# Patient Record
Sex: FEMALE | Race: WHITE | Employment: PART TIME | URBAN - METROPOLITAN AREA
[De-identification: names, ages, dates, MRNs, and addresses within clinical notes are randomized per-mention and may not be internally consistent; named-entity substitution may affect disease eponyms.]

---

## 2017-04-17 ENCOUNTER — APPOINTMENT (OUTPATIENT)
Dept: OTHER | Facility: HOSPITAL | Age: 22
End: 2017-04-17
Attending: PREVENTIVE MEDICINE

## 2017-09-17 ENCOUNTER — CHARTING TRANS (OUTPATIENT)
Dept: OTHER | Age: 22
End: 2017-09-17

## 2017-09-19 ENCOUNTER — CHARTING TRANS (OUTPATIENT)
Dept: OTHER | Age: 22
End: 2017-09-19

## 2018-08-09 PROCEDURE — 88175 CYTOPATH C/V AUTO FLUID REDO: CPT | Performed by: OBSTETRICS & GYNECOLOGY

## 2018-08-13 ENCOUNTER — CHARTING TRANS (OUTPATIENT)
Dept: OTHER | Age: 23
End: 2018-08-13

## 2018-09-05 PROBLEM — R45.87 POOR IMPULSE CONTROL: Status: ACTIVE | Noted: 2018-09-05

## 2018-09-05 PROBLEM — R45.851 SUICIDAL IDEATION: Status: ACTIVE | Noted: 2018-09-05

## 2018-09-05 PROBLEM — R45.4 IRRITABILITY AND ANGER: Status: ACTIVE | Noted: 2018-09-05

## 2018-09-05 PROBLEM — F32.A DEPRESSION: Status: ACTIVE | Noted: 2018-09-05

## 2018-11-27 VITALS
WEIGHT: 140 LBS | HEIGHT: 60 IN | HEART RATE: 84 BPM | RESPIRATION RATE: 20 BRPM | DIASTOLIC BLOOD PRESSURE: 68 MMHG | SYSTOLIC BLOOD PRESSURE: 100 MMHG | BODY MASS INDEX: 27.48 KG/M2 | TEMPERATURE: 98.4 F

## 2018-12-26 ENCOUNTER — WALK IN (OUTPATIENT)
Dept: URGENT CARE | Age: 23
End: 2018-12-26

## 2018-12-26 VITALS
HEIGHT: 60 IN | BODY MASS INDEX: 31.4 KG/M2 | SYSTOLIC BLOOD PRESSURE: 94 MMHG | DIASTOLIC BLOOD PRESSURE: 62 MMHG | RESPIRATION RATE: 16 BRPM | HEART RATE: 80 BPM | TEMPERATURE: 98.5 F | WEIGHT: 159.94 LBS

## 2018-12-26 DIAGNOSIS — J00 ACUTE NASOPHARYNGITIS: Primary | ICD-10-CM

## 2018-12-26 LAB
INTERNAL PROCEDURAL CONTROLS ACCEPTABLE: YES
S PYO AG THROAT QL IA.RAPID: NEGATIVE

## 2018-12-26 PROCEDURE — 99213 OFFICE O/P EST LOW 20 MIN: CPT | Performed by: NURSE PRACTITIONER

## 2018-12-26 PROCEDURE — 87077 CULTURE AEROBIC IDENTIFY: CPT | Performed by: NURSE PRACTITIONER

## 2018-12-26 PROCEDURE — 87880 STREP A ASSAY W/OPTIC: CPT | Performed by: NURSE PRACTITIONER

## 2018-12-26 PROCEDURE — 87081 CULTURE SCREEN ONLY: CPT | Performed by: NURSE PRACTITIONER

## 2018-12-26 RX ORDER — NORETHINDRONE ACETATE AND ETHINYL ESTRADIOL 1; .02 MG/1; MG/1
TABLET ORAL DAILY
COMMUNITY
End: 2022-04-05

## 2018-12-26 RX ORDER — ARIPIPRAZOLE 2 MG/1
2 TABLET ORAL DAILY
COMMUNITY
End: 2022-04-05

## 2018-12-26 SDOH — ECONOMIC STABILITY: FOOD INSECURITY: WITHIN THE PAST 12 MONTHS, YOU WORRIED THAT YOUR FOOD WOULD RUN OUT BEFORE YOU GOT MONEY TO BUY MORE.: PATIENT DECLINED

## 2018-12-26 SDOH — SOCIAL STABILITY: SOCIAL NETWORK: HOW OFTEN DO YOU ATTEND CHURCH OR RELIGIOUS SERVICES?: PATIENT DECLINED

## 2018-12-26 SDOH — ECONOMIC STABILITY: TRANSPORTATION INSECURITY
IN THE PAST 12 MONTHS, HAS THE LACK OF TRANSPORTATION KEPT YOU FROM MEDICAL APPOINTMENTS OR FROM GETTING MEDICATIONS?: PATIENT DECLINED

## 2018-12-26 SDOH — SOCIAL STABILITY: SOCIAL INSECURITY
WITHIN THE LAST YEAR, HAVE YOU BEEN HUMILIATED OR EMOTIONALLY ABUSED IN OTHER WAYS BY YOUR PARTNER OR EX-PARTNER?: PATIENT DECLINED

## 2018-12-26 SDOH — HEALTH STABILITY: MENTAL HEALTH: HOW OFTEN DO YOU HAVE A DRINK CONTAINING ALCOHOL?: NEVER

## 2018-12-26 SDOH — SOCIAL STABILITY: SOCIAL INSECURITY
WITHIN THE LAST YEAR, HAVE YOU BEEN KICKED, HIT, SLAPPED, OR OTHERWISE PHYSICALLY HURT BY YOUR PARTNER OR EX-PARTNER?: PATIENT DECLINED

## 2018-12-26 SDOH — SOCIAL STABILITY: SOCIAL INSECURITY: WITHIN THE LAST YEAR, HAVE YOU BEEN AFRAID OF YOUR PARTNER OR EX-PARTNER?: PATIENT DECLINED

## 2018-12-26 SDOH — SOCIAL STABILITY: SOCIAL NETWORK: HOW OFTEN DO YOU ATTENT MEETINGS OF THE CLUB OR ORGANIZATION YOU BELONG TO?: PATIENT DECLINED

## 2018-12-26 SDOH — ECONOMIC STABILITY: TRANSPORTATION INSECURITY
IN THE PAST 12 MONTHS, HAS LACK OF TRANSPORTATION KEPT YOU FROM MEETINGS, WORK, OR FROM GETTING THINGS NEEDED FOR DAILY LIVING?: PATIENT DECLINED

## 2018-12-26 SDOH — HEALTH STABILITY: PHYSICAL HEALTH: ON AVERAGE, HOW MANY DAYS PER WEEK DO YOU ENGAGE IN MODERATE TO STRENUOUS EXERCISE (LIKE A BRISK WALK)?: 0 DAYS

## 2018-12-26 SDOH — SOCIAL STABILITY: SOCIAL NETWORK
DO YOU BELONG TO ANY CLUBS OR ORGANIZATIONS SUCH AS CHURCH GROUPS UNIONS, FRATERNAL OR ATHLETIC GROUPS, OR SCHOOL GROUPS?: PATIENT DECLINED

## 2018-12-26 SDOH — ECONOMIC STABILITY: INCOME INSECURITY: HOW HARD IS IT FOR YOU TO PAY FOR THE VERY BASICS LIKE FOOD, HOUSING, MEDICAL CARE, AND HEATING?: PATIENT DECLINED

## 2018-12-26 SDOH — SOCIAL STABILITY: SOCIAL INSECURITY
WITHIN THE LAST YEAR, HAVE TO BEEN RAPED OR FORCED TO HAVE ANY KIND OF SEXUAL ACTIVITY BY YOUR PARTNER OR EX-PARTNER?: PATIENT DECLINED

## 2018-12-26 SDOH — ECONOMIC STABILITY: FOOD INSECURITY: WITHIN THE PAST 12 MONTHS, THE FOOD YOU BOUGHT JUST DIDN'T LAST AND YOU DIDN'T HAVE MONEY TO GET MORE.: PATIENT DECLINED

## 2018-12-26 SDOH — HEALTH STABILITY: MENTAL HEALTH
STRESS IS WHEN SOMEONE FEELS TENSE, NERVOUS, ANXIOUS, OR CAN'T SLEEP AT NIGHT BECAUSE THEIR MIND IS TROUBLED. HOW STRESSED ARE YOU?: PATIENT DECLINED

## 2018-12-26 SDOH — SOCIAL STABILITY: SOCIAL NETWORK: HOW OFTEN DO YOU GET TOGETHER WITH FRIENDS OR RELATIVES?: PATIENT DECLINED

## 2018-12-26 SDOH — SOCIAL STABILITY: SOCIAL NETWORK: IN A TYPICAL WEEK, HOW MANY TIMES DO YOU TALK ON THE PHONE WITH FAMILY, FRIENDS, OR NEIGHBORS?: PATIENT DECLINED

## 2018-12-26 SDOH — SOCIAL STABILITY: SOCIAL NETWORK: ARE YOU MARRIED, WIDOWED, DIVORCED, SEPARATED, NEVER MARRIED, OR LIVING WITH A PARTNER?: PATIENT DECLINED

## 2018-12-26 SDOH — HEALTH STABILITY: PHYSICAL HEALTH: ON AVERAGE, HOW MANY MINUTES DO YOU ENGAGE IN EXERCISE AT THIS LEVEL?: 0 MIN

## 2018-12-26 ASSESSMENT — ENCOUNTER SYMPTOMS
EYES NEGATIVE: 1
RESPIRATORY NEGATIVE: 1
ALLERGIC/IMMUNOLOGIC COMMENTS: SEASONAL ALLERGIES
DIZZINESS: 0
HEADACHES: 1
GASTROINTESTINAL NEGATIVE: 1

## 2018-12-28 LAB
REPORT STATUS (RPT): ABNORMAL
S PYO SPEC QL CULT: ABNORMAL
SPECIMEN SOURCE: ABNORMAL

## 2018-12-29 ENCOUNTER — TELEPHONE (OUTPATIENT)
Dept: URGENT CARE | Age: 23
End: 2018-12-29

## 2018-12-31 ENCOUNTER — TELEPHONE (OUTPATIENT)
Dept: SCHEDULING | Age: 23
End: 2018-12-31

## 2019-02-18 ENCOUNTER — WALK IN (OUTPATIENT)
Dept: URGENT CARE | Age: 24
End: 2019-02-18

## 2019-02-18 VITALS
HEART RATE: 76 BPM | BODY MASS INDEX: 31.41 KG/M2 | DIASTOLIC BLOOD PRESSURE: 60 MMHG | TEMPERATURE: 98 F | WEIGHT: 160 LBS | HEIGHT: 60 IN | SYSTOLIC BLOOD PRESSURE: 94 MMHG

## 2019-02-18 DIAGNOSIS — J00 ACUTE NASOPHARYNGITIS: Primary | ICD-10-CM

## 2019-02-18 LAB
INTERNAL PROCEDURAL CONTROLS ACCEPTABLE: YES
S PYO AG THROAT QL IA.RAPID: NEGATIVE

## 2019-02-18 PROCEDURE — 99213 OFFICE O/P EST LOW 20 MIN: CPT | Performed by: NURSE PRACTITIONER

## 2019-02-18 PROCEDURE — 87880 STREP A ASSAY W/OPTIC: CPT | Performed by: NURSE PRACTITIONER

## 2019-02-18 SDOH — SOCIAL STABILITY: SOCIAL NETWORK: IN A TYPICAL WEEK, HOW MANY TIMES DO YOU TALK ON THE PHONE WITH FAMILY, FRIENDS, OR NEIGHBORS?: PATIENT DECLINED

## 2019-02-18 SDOH — HEALTH STABILITY: PHYSICAL HEALTH: ON AVERAGE, HOW MANY MINUTES DO YOU ENGAGE IN EXERCISE AT THIS LEVEL?: 0 MIN

## 2019-02-18 SDOH — HEALTH STABILITY: MENTAL HEALTH: HOW OFTEN DO YOU HAVE A DRINK CONTAINING ALCOHOL?: NEVER

## 2019-02-18 SDOH — ECONOMIC STABILITY: INCOME INSECURITY: HOW HARD IS IT FOR YOU TO PAY FOR THE VERY BASICS LIKE FOOD, HOUSING, MEDICAL CARE, AND HEATING?: PATIENT DECLINED

## 2019-02-18 SDOH — SOCIAL STABILITY: SOCIAL NETWORK: HOW OFTEN DO YOU ATTEND CHURCH OR RELIGIOUS SERVICES?: PATIENT DECLINED

## 2019-02-18 SDOH — SOCIAL STABILITY: SOCIAL NETWORK: HOW OFTEN DO YOU ATTENT MEETINGS OF THE CLUB OR ORGANIZATION YOU BELONG TO?: PATIENT DECLINED

## 2019-02-18 SDOH — SOCIAL STABILITY: SOCIAL NETWORK: HOW OFTEN DO YOU GET TOGETHER WITH FRIENDS OR RELATIVES?: PATIENT DECLINED

## 2019-02-18 SDOH — SOCIAL STABILITY: SOCIAL INSECURITY: WITHIN THE LAST YEAR, HAVE YOU BEEN AFRAID OF YOUR PARTNER OR EX-PARTNER?: PATIENT DECLINED

## 2019-02-18 SDOH — ECONOMIC STABILITY: FOOD INSECURITY: WITHIN THE PAST 12 MONTHS, YOU WORRIED THAT YOUR FOOD WOULD RUN OUT BEFORE YOU GOT MONEY TO BUY MORE.: PATIENT DECLINED

## 2019-02-18 SDOH — SOCIAL STABILITY: SOCIAL NETWORK: ARE YOU MARRIED, WIDOWED, DIVORCED, SEPARATED, NEVER MARRIED, OR LIVING WITH A PARTNER?: PATIENT DECLINED

## 2019-02-18 SDOH — ECONOMIC STABILITY: FOOD INSECURITY: WITHIN THE PAST 12 MONTHS, THE FOOD YOU BOUGHT JUST DIDN'T LAST AND YOU DIDN'T HAVE MONEY TO GET MORE.: PATIENT DECLINED

## 2019-02-18 SDOH — HEALTH STABILITY: PHYSICAL HEALTH: ON AVERAGE, HOW MANY DAYS PER WEEK DO YOU ENGAGE IN MODERATE TO STRENUOUS EXERCISE (LIKE A BRISK WALK)?: 0 DAYS

## 2019-04-15 ENCOUNTER — WALK IN (OUTPATIENT)
Dept: URGENT CARE | Age: 24
End: 2019-04-15

## 2019-04-15 VITALS — BODY MASS INDEX: 31.41 KG/M2 | RESPIRATION RATE: 18 BRPM | WEIGHT: 160 LBS | HEIGHT: 60 IN

## 2019-04-15 DIAGNOSIS — Z11.1 SCREENING-PULMONARY TB: ICD-10-CM

## 2019-04-15 DIAGNOSIS — Z02.1 PRE-EMPLOYMENT EXAMINATION: Primary | ICD-10-CM

## 2019-04-15 PROCEDURE — 86580 TB INTRADERMAL TEST: CPT | Performed by: NURSE PRACTITIONER

## 2019-04-15 PROCEDURE — X0945 SELF PAY APN OR PA PERFORMED ADMINISTRATIVE PHYSICAL: HCPCS | Performed by: NURSE PRACTITIONER

## 2019-04-15 RX ORDER — OLANZAPINE 5 MG/1
5 TABLET ORAL
COMMUNITY
Start: 2019-04-05 | End: 2022-04-05

## 2019-04-17 ENCOUNTER — WALK IN (OUTPATIENT)
Dept: URGENT CARE | Age: 24
End: 2019-04-17

## 2019-04-17 DIAGNOSIS — Z11.1 ENCOUNTER FOR PPD SKIN TEST READING: Primary | ICD-10-CM

## 2019-04-17 LAB
INDURATION: NORMAL MM (ref 0–?)
SKIN TEST RESULT: NEGATIVE

## 2019-04-17 PROCEDURE — X1094 NO CHARGE VISIT: HCPCS | Performed by: NURSE PRACTITIONER

## 2019-05-30 PROBLEM — F32.9 MDD (MAJOR DEPRESSIVE DISORDER), SINGLE EPISODE: Status: ACTIVE | Noted: 2019-05-30

## 2021-06-28 ENCOUNTER — WALK IN (OUTPATIENT)
Dept: URGENT CARE | Age: 26
End: 2021-06-28

## 2021-06-28 DIAGNOSIS — Z11.1 SCREENING-PULMONARY TB: Primary | ICD-10-CM

## 2021-06-28 PROCEDURE — 86580 TB INTRADERMAL TEST: CPT | Performed by: NURSE PRACTITIONER

## 2021-06-30 ENCOUNTER — WALK IN (OUTPATIENT)
Dept: URGENT CARE | Age: 26
End: 2021-06-30

## 2021-06-30 DIAGNOSIS — Z11.1 ENCOUNTER FOR PPD SKIN TEST READING: Primary | ICD-10-CM

## 2021-06-30 LAB
INDURATION: 0 MM (ref 0–?)
SKIN TEST RESULT: NEGATIVE

## 2021-06-30 PROCEDURE — X1094 NO CHARGE VISIT: HCPCS | Performed by: NURSE PRACTITIONER

## 2022-01-10 PROBLEM — F33.1 MDD (MAJOR DEPRESSIVE DISORDER), RECURRENT EPISODE, MODERATE (HCC): Status: ACTIVE | Noted: 2022-01-10

## 2022-01-10 PROBLEM — F44.9 DISSOCIATION: Status: ACTIVE | Noted: 2022-01-10

## 2022-01-10 PROBLEM — F43.10 PTSD (POST-TRAUMATIC STRESS DISORDER): Status: ACTIVE | Noted: 2022-01-10

## 2022-01-10 PROBLEM — F48.1: Status: ACTIVE | Noted: 2022-01-10

## 2022-04-05 ENCOUNTER — WALK IN (OUTPATIENT)
Dept: URGENT CARE | Age: 27
End: 2022-04-05

## 2022-04-05 VITALS
HEIGHT: 60 IN | RESPIRATION RATE: 20 BRPM | BODY MASS INDEX: 24.54 KG/M2 | SYSTOLIC BLOOD PRESSURE: 100 MMHG | DIASTOLIC BLOOD PRESSURE: 70 MMHG | WEIGHT: 125 LBS | TEMPERATURE: 98.8 F | HEART RATE: 80 BPM

## 2022-04-05 DIAGNOSIS — J30.9 ALLERGIC RHINITIS, UNSPECIFIED SEASONALITY, UNSPECIFIED TRIGGER: ICD-10-CM

## 2022-04-05 DIAGNOSIS — J06.9 UPPER RESPIRATORY TRACT INFECTION, UNSPECIFIED TYPE: Primary | ICD-10-CM

## 2022-04-05 LAB
INTERNAL PROCEDURAL CONTROLS ACCEPTABLE: YES
S PYO AG THROAT QL IA.RAPID: NEGATIVE

## 2022-04-05 PROCEDURE — U0003 INFECTIOUS AGENT DETECTION BY NUCLEIC ACID (DNA OR RNA); SEVERE ACUTE RESPIRATORY SYNDROME CORONAVIRUS 2 (SARS-COV-2) (CORONAVIRUS DISEASE [COVID-19]), AMPLIFIED PROBE TECHNIQUE, MAKING USE OF HIGH THROUGHPUT TECHNOLOGIES AS DESCRIBED BY CMS-2020-01-R: HCPCS | Performed by: INTERNAL MEDICINE

## 2022-04-05 PROCEDURE — 99213 OFFICE O/P EST LOW 20 MIN: CPT | Performed by: NURSE PRACTITIONER

## 2022-04-05 PROCEDURE — 87880 STREP A ASSAY W/OPTIC: CPT | Performed by: NURSE PRACTITIONER

## 2022-04-05 PROCEDURE — U0005 INFEC AGEN DETEC AMPLI PROBE: HCPCS | Performed by: INTERNAL MEDICINE

## 2022-04-06 LAB
SARS-COV-2 RNA RESP QL NAA+PROBE: NOT DETECTED
SERVICE CMNT-IMP: NORMAL
SERVICE CMNT-IMP: NORMAL

## 2023-03-10 ENCOUNTER — OFFICE VISIT (OUTPATIENT)
Dept: INTERNAL MEDICINE CLINIC | Facility: CLINIC | Age: 28
End: 2023-03-10
Payer: COMMERCIAL

## 2023-03-10 VITALS
SYSTOLIC BLOOD PRESSURE: 112 MMHG | HEART RATE: 80 BPM | WEIGHT: 132.81 LBS | OXYGEN SATURATION: 97 % | DIASTOLIC BLOOD PRESSURE: 56 MMHG | BODY MASS INDEX: 25.73 KG/M2 | HEIGHT: 60.04 IN | TEMPERATURE: 97 F

## 2023-03-10 DIAGNOSIS — R42 LIGHTHEADEDNESS: Primary | ICD-10-CM

## 2023-03-10 DIAGNOSIS — F33.2 SEVERE EPISODE OF RECURRENT MAJOR DEPRESSIVE DISORDER, WITHOUT PSYCHOTIC FEATURES (HCC): ICD-10-CM

## 2023-03-10 DIAGNOSIS — F50.9 EATING DISORDER, UNSPECIFIED TYPE: ICD-10-CM

## 2023-03-10 LAB
ATRIAL RATE: 68 BPM
P AXIS: 56 DEGREES
P-R INTERVAL: 168 MS
Q-T INTERVAL: 398 MS
QRS DURATION: 72 MS
QTC CALCULATION (BEZET): 423 MS
R AXIS: 84 DEGREES
T AXIS: 57 DEGREES
VENTRICULAR RATE: 68 BPM

## 2023-03-10 RX ORDER — BUPROPION HYDROCHLORIDE 75 MG/1
1 TABLET ORAL DAILY
COMMUNITY
Start: 2023-02-05

## 2023-03-10 RX ORDER — BUSPIRONE HYDROCHLORIDE 10 MG/1
1 TABLET ORAL 3 TIMES DAILY
COMMUNITY
Start: 2023-03-01

## 2025-01-13 ENCOUNTER — TELEPHONE (OUTPATIENT)
Dept: PHYSICAL THERAPY | Facility: HOSPITAL | Age: 30
End: 2025-01-13

## 2025-01-13 ENCOUNTER — LAB ENCOUNTER (OUTPATIENT)
Dept: LAB | Age: 30
End: 2025-01-13
Attending: PHYSICIAN ASSISTANT
Payer: COMMERCIAL

## 2025-01-13 ENCOUNTER — OFFICE VISIT (OUTPATIENT)
Dept: INTERNAL MEDICINE CLINIC | Facility: CLINIC | Age: 30
End: 2025-01-13
Payer: COMMERCIAL

## 2025-01-13 VITALS
DIASTOLIC BLOOD PRESSURE: 68 MMHG | SYSTOLIC BLOOD PRESSURE: 112 MMHG | BODY MASS INDEX: 25.91 KG/M2 | TEMPERATURE: 99 F | RESPIRATION RATE: 16 BRPM | OXYGEN SATURATION: 98 % | WEIGHT: 132 LBS | HEART RATE: 77 BPM | HEIGHT: 60 IN

## 2025-01-13 DIAGNOSIS — Z13.1 SCREENING FOR DIABETES MELLITUS (DM): ICD-10-CM

## 2025-01-13 DIAGNOSIS — R42 VERTIGO: Primary | ICD-10-CM

## 2025-01-13 DIAGNOSIS — R42 VERTIGO: ICD-10-CM

## 2025-01-13 DIAGNOSIS — Z13.220 SCREENING, LIPID: ICD-10-CM

## 2025-01-13 LAB
ALBUMIN SERPL-MCNC: 4.6 G/DL (ref 3.2–4.8)
ALBUMIN/GLOB SERPL: 1.6 {RATIO} (ref 1–2)
ALP LIVER SERPL-CCNC: 63 U/L
ALT SERPL-CCNC: 24 U/L
ANION GAP SERPL CALC-SCNC: 5 MMOL/L (ref 0–18)
AST SERPL-CCNC: 22 U/L (ref ?–34)
BASOPHILS # BLD AUTO: 0.05 X10(3) UL (ref 0–0.2)
BASOPHILS NFR BLD AUTO: 1 %
BILIRUB SERPL-MCNC: 0.3 MG/DL (ref 0.3–1.2)
BUN BLD-MCNC: 13 MG/DL (ref 9–23)
CALCIUM BLD-MCNC: 9.8 MG/DL (ref 8.7–10.4)
CHLORIDE SERPL-SCNC: 105 MMOL/L (ref 98–112)
CHOLEST SERPL-MCNC: 195 MG/DL (ref ?–200)
CO2 SERPL-SCNC: 28 MMOL/L (ref 21–32)
CREAT BLD-MCNC: 0.87 MG/DL
DEPRECATED HBV CORE AB SER IA-ACNC: 13 NG/ML
EGFRCR SERPLBLD CKD-EPI 2021: 93 ML/MIN/1.73M2 (ref 60–?)
EOSINOPHIL # BLD AUTO: 0.08 X10(3) UL (ref 0–0.7)
EOSINOPHIL NFR BLD AUTO: 1.6 %
ERYTHROCYTE [DISTWIDTH] IN BLOOD BY AUTOMATED COUNT: 12.1 %
FASTING PATIENT LIPID ANSWER: YES
FASTING STATUS PATIENT QL REPORTED: YES
GLOBULIN PLAS-MCNC: 2.8 G/DL (ref 2–3.5)
GLUCOSE BLD-MCNC: 86 MG/DL (ref 70–99)
HCT VFR BLD AUTO: 39.7 %
HDLC SERPL-MCNC: 67 MG/DL (ref 40–59)
HGB BLD-MCNC: 13.6 G/DL
IMM GRANULOCYTES # BLD AUTO: 0.01 X10(3) UL (ref 0–1)
IMM GRANULOCYTES NFR BLD: 0.2 %
IRON SATN MFR SERPL: 16 %
IRON SERPL-MCNC: 49 UG/DL
LDLC SERPL CALC-MCNC: 115 MG/DL (ref ?–100)
LYMPHOCYTES # BLD AUTO: 1.57 X10(3) UL (ref 1–4)
LYMPHOCYTES NFR BLD AUTO: 30.5 %
MCH RBC QN AUTO: 32.2 PG (ref 26–34)
MCHC RBC AUTO-ENTMCNC: 34.3 G/DL (ref 31–37)
MCV RBC AUTO: 93.9 FL
MONOCYTES # BLD AUTO: 0.33 X10(3) UL (ref 0.1–1)
MONOCYTES NFR BLD AUTO: 6.4 %
NEUTROPHILS # BLD AUTO: 3.11 X10 (3) UL (ref 1.5–7.7)
NEUTROPHILS # BLD AUTO: 3.11 X10(3) UL (ref 1.5–7.7)
NEUTROPHILS NFR BLD AUTO: 60.3 %
NONHDLC SERPL-MCNC: 128 MG/DL (ref ?–130)
OSMOLALITY SERPL CALC.SUM OF ELEC: 285 MOSM/KG (ref 275–295)
PLATELET # BLD AUTO: 246 10(3)UL (ref 150–450)
POTASSIUM SERPL-SCNC: 4.2 MMOL/L (ref 3.5–5.1)
PROT SERPL-MCNC: 7.4 G/DL (ref 5.7–8.2)
RBC # BLD AUTO: 4.23 X10(6)UL
SODIUM SERPL-SCNC: 138 MMOL/L (ref 136–145)
TOTAL IRON BINDING CAPACITY: 304 UG/DL (ref 250–425)
TRANSFERRIN SERPL-MCNC: 234 MG/DL (ref 250–380)
TRIGL SERPL-MCNC: 71 MG/DL (ref 30–149)
TSI SER-ACNC: 0.93 UIU/ML (ref 0.55–4.78)
VIT B12 SERPL-MCNC: 716 PG/ML (ref 211–911)
VLDLC SERPL CALC-MCNC: 12 MG/DL (ref 0–30)
WBC # BLD AUTO: 5.2 X10(3) UL (ref 4–11)

## 2025-01-13 PROCEDURE — 83550 IRON BINDING TEST: CPT | Performed by: PHYSICIAN ASSISTANT

## 2025-01-13 PROCEDURE — 82728 ASSAY OF FERRITIN: CPT | Performed by: PHYSICIAN ASSISTANT

## 2025-01-13 PROCEDURE — 80061 LIPID PANEL: CPT | Performed by: PHYSICIAN ASSISTANT

## 2025-01-13 PROCEDURE — 83540 ASSAY OF IRON: CPT | Performed by: PHYSICIAN ASSISTANT

## 2025-01-13 PROCEDURE — 80050 GENERAL HEALTH PANEL: CPT | Performed by: PHYSICIAN ASSISTANT

## 2025-01-13 PROCEDURE — 82607 VITAMIN B-12: CPT | Performed by: PHYSICIAN ASSISTANT

## 2025-01-13 RX ORDER — BUPROPION HYDROCHLORIDE 300 MG/1
TABLET ORAL
COMMUNITY

## 2025-01-13 RX ORDER — PROPRANOLOL HYDROCHLORIDE 10 MG/1
10 TABLET ORAL 2 TIMES DAILY PRN
COMMUNITY
Start: 2024-08-12

## 2025-01-13 RX ORDER — FLUOXETINE HYDROCHLORIDE 40 MG/1
CAPSULE ORAL
COMMUNITY

## 2025-01-13 RX ORDER — LISDEXAMFETAMINE DIMESYLATE 40 MG/1
CAPSULE ORAL
COMMUNITY

## 2025-01-13 RX ORDER — QUETIAPINE FUMARATE 25 MG/1
TABLET, FILM COATED ORAL
COMMUNITY
Start: 2025-01-12

## 2025-01-13 RX ORDER — MECLIZINE HYDROCHLORIDE 25 MG/1
25 TABLET ORAL NIGHTLY PRN
Qty: 30 TABLET | Refills: 0 | Status: SHIPPED | OUTPATIENT
Start: 2025-01-13

## 2025-01-13 NOTE — PROGRESS NOTES
Yina Perkins is a 29 year old female.   Chief Complaint   Patient presents with    Lab Results     TA RM4    Vertigo     HPI:    Pt presents with dizziness x 1-2 years. This is intermittent but occurs daily. Triggered by sudden movements. Feels like the world is spinning/moving. Occurs when rolling over in bed. Feels different than when she had lightheadedness in the past. Denies sinus symptoms and ear pain. +bilateral tinnitus. Denies hearing loss.       Allergies:  Allergies[1]   Current Meds:  Current Outpatient Medications   Medication Sig Dispense Refill    buPROPion  MG Oral Tablet 24 Hr       PROZAC 40 MG Oral Cap       lisdexamfetamine 40 MG Oral Cap       propranolol 10 MG Oral Tab Take 1 tablet (10 mg total) by mouth 2 (two) times daily as needed.      SEROQUEL 25 MG Oral Tab       meclizine 25 MG Oral Tab Take 1 tablet (25 mg total) by mouth nightly as needed for Dizziness. 30 tablet 0        PMH:     Past Medical History:    Depression       ROS:   GENERAL: Negative for fever, chills and fatigue. NAD.  HENT: Negative for congestion, sore throat, and ear pain.  RESPIRATORY: Negative for cough, chest tightness, shortness of breath and wheezing.    CV: Negative for chest pain, palpitations and leg swelling.   GI: Negative for nausea, vomiting, abdominal pain, diarrhea, and blood in stool.   : Negative for dysuria, hematuria and difficulty urinating.   MUSCULOSKELETAL: Negative for myalgias, back pain, joint swelling, arthralgias and gait problem.   NEURO: +dizziness       PHYSICAL EXAM:    /68   Pulse 77   Temp 98.5 °F (36.9 °C)   Resp 16   Ht 5' (1.524 m)   Wt 132 lb (59.9 kg)   SpO2 98%   BMI 25.78 kg/m²     GENERAL: NAD. A&Ox3  HEENT: Ear canals clear, TMs pearly gray bilaterally. Throat without erythema or exudates.  NECK: No LAD.   RESPIRATORY: CTAB, no R/R/W  CV: RRR, no murmurs.   NEURO: CN 2-12 grossly intact, normal gait, strength 5/5 b/l; no focal deficits   PSYCH:  Appropriate mood and affect.      ASSESSMENT/ PLAN:   1. Vertigo  Suspect BPPV  Check labs to further eval due to duration of symptoms   Can try meclizine prn - pt aware may cause drowsiness   Also recommend trial of vestibular PT  Would check brain MRI if persists   - CBC W Differential W Platelet [E]; Future  - TSH W Reflex To Free T4 [E]; Future  - Vitamin B12 [E]; Future  - Ferritin [E]; Future  - Iron And Tibc [E]; Future  - Physical Therapy Referral - [facility]    2. Screening for diabetes mellitus (DM)  - Comp Metabolic Panel (14) [E]; Future    3. Screening, lipid  - Lipid Panel [E]; Future       Health Maintenance Due   Topic Date Due    Annual Physical  Never done    COVID-19 Vaccine (4 - 2024-25 season) 09/01/2024    Influenza Vaccine (1) Never done    Annual Depression Screening  01/01/2025    Pap Smear  01/10/2025           Pt indicates understanding and agrees to the plan.     Return if symptoms worsen or fail to improve.    Melody Dyer PA-C           [1] No Known Allergies

## 2025-01-14 ENCOUNTER — OFFICE VISIT (OUTPATIENT)
Dept: PHYSICAL THERAPY | Facility: HOSPITAL | Age: 30
End: 2025-01-14
Attending: PHYSICIAN ASSISTANT
Payer: COMMERCIAL

## 2025-01-14 DIAGNOSIS — R42 VERTIGO: Primary | ICD-10-CM

## 2025-01-14 DIAGNOSIS — H81.10 BPPV (BENIGN PAROXYSMAL POSITIONAL VERTIGO), UNSPECIFIED LATERALITY: ICD-10-CM

## 2025-01-14 DIAGNOSIS — R42 DIZZINESS AND GIDDINESS: ICD-10-CM

## 2025-01-14 DIAGNOSIS — E61.1 IRON DEFICIENCY: Primary | ICD-10-CM

## 2025-01-14 PROCEDURE — 97112 NEUROMUSCULAR REEDUCATION: CPT

## 2025-01-14 PROCEDURE — 97162 PT EVAL MOD COMPLEX 30 MIN: CPT

## 2025-01-14 NOTE — PROGRESS NOTES
VESTIBULAR EVALUATION:     Diagnosis:   Dizziness      Visual motion sensitivity  Vestibular migraines     Referring Provider: Melody Dyer  Date of Evaluation:    1/14/2025    Precautions:  Fall Risk Next MD visit:   none scheduled  Date of Surgery: n/a     PATIENT SUMMARY   Yina Perkins is a 29 year old female who presents to therapy today with reports of dizziness. Pt reports she has always had some dizziness, but it has gotten worse in the past few months. Dizziness comes and goes based on head movement. Quick head movements and looking up increase dizziness. Describes it has a heaviness in the head. Also sometimes feels like the room is spinning. Feels dizzy when moving head to either direction. Dizziness last about 5-10 min. Stress has increased past few months and this may be related to dizziness. Denies neck pain. Has had Increased sensitivity to light and increase in tinnitus past few months. Has history of HA/ migraines.  Denies recent illness head injuries, N/T in face, diplopia or drop attacks. Has history of motion sickness and has been getting more nausea w/ this dizziness.   History/onset of current condition:       Falls: - yes- twice last week.   Hx of migraines: Yes: HA.   Hx of vision issue: Yes: - increased photosensitivity  Hx of hearing issues: tinnitus    Dizziness: Current 0/10, Best 0/10, Worst 4/10  Quality: spinning, heavy head,   Frequency/Duration:  a few min  Aggravates: Supine to/from sit, Sit to stand, Rolling, and Quick head movements  Relieves: Unsure    Headache: Current 0/10,    Dizziness Handicap Inventory (DHI): 66     Current functional limitations include head movements.   Yina describes prior level of function dizziness for years.  Pt goals include reduced dizziness  Past medical history was reviewed with Yina. Significant findings include     Dissociation   Derealization (HCC)   MDD (major depressive disorder), recurrent episode, moderate (HCC)    PTSD (post-traumatic stress disorder)   Eating disorder   MDD (major depressive disorder), single episode   Depression   Suicidal ideation   Irritability and anger   Poor impulse control        ASSESSMENT  Yina presents to physical therapy evaluation with primary c/o dizziness. The results of the objective tests and measures show (+) VOR cancellation.  Functional deficits include but are not limited to dizziness w/ head turns and quick movements.  Signs and symptoms are consistent with diagnosis of visual motion sensitivity. Pt and PT discussed evaluation findings, pathology, POC and HEP.  Pt voiced understanding and performs HEP correctly. Skilled Physical Therapy is medically necessary to address the above impairments and reach functional goals.    OBJECTIVE:   Physical Exam:  BP: 115/75  Neuro Screen: Sensation: UE reflexes normal     Coordination Testing:   Finger to Nose: WNL      Occulomotor & Vestibulo-Ocular Exam:  Spontaneous Nystagmus: room light: none ;  fixation blocked: NT  Smooth Pursuit: Negative  Saccades: Negative  Gaze Evoked Nystagmus:  room light: Negative; fixation blocked: Not Tested  Head Thrust: (+) on L  VOR screen:  mild dizziness    VOR Cancellation: dizziness   Convergence: Negative  Cover/Uncover:  Negative  Cross Cover:  Negative  Head Shaking Nystagmus: Not Tested  Dynamic Visual Acuity:  Not Tested    Positional Testing:   [place]-Hallpike: R -, L -   Roll Test (HC): -   Head hang (-)     Functional Mobility:   Gait: pt ambulates on level ground with normal mechanics.       Today's Treatment and Response:   Pt education was provided on exam findings, treatment diagnosis, treatment plan, expectations, and prognosis. Pt was also provided recommendations for activity modifications, possible soreness after evaluation, modalities as needed [ice/heat], postural corrections, ergonomics, pain science education , detrimental fear avoidance behaviors, importance of remaining active,  strategies to reduce fall risk at home, shoe wear, and possible dizziness after evaluation.   Patient was instructed in and issued a HEP for:   - VOR Cancellation 6x30 sec     Charges: PT Eval Moderate Complexity, x neuro re-ed (10 min- pt edu and HEP)      Total Timed Treatment: 10 min     Total Treatment Time: 35 min     Based on clinical rationale and outcome measures, this evaluation involved Moderate Complexity decision making due to 1-2 personal factors/comorbidities, 3 body structures involved/activity limitations, and evolving symptoms including changing dizziness levels.  PLAN OF CARE:    Goals: (to be met in 10 visits)  - Within 5 sessions, pt will be consistent and independent w/ HEP, thus facilitating recovery.   -Within 5 session, pt will report no greater than 2/10 dizziness on daily basis.   -Within 10 sessions, pt will be able to complete VOR cancellation in sitting for 30 seconds w/o reports of symptoms, thus allowing her to ride in car w/o dizziness.   -Within 10 sessions, pt will be able to perform all bed mobility w/o reports of dizziness, allowing for increased safety when going from sitting to standing.     Frequency / Duration: Patient will be seen for 1-2 x/week or a total of 10 visits over a 90 day period. Treatment will include: home exercise program development and instruction, patient/family education, balance training, canalith repositioning maneuver, eye/head coordination exercises, sensory organization training, optokinetic stimulation , ROM, exertion training, gait training, manual therapy, and strengthening.     Education or treatment limitation: None   Rehab Potential: good     Patient/Family/Caregiver was advised of these findings, precautions, and treatment options and has agreed to actively participate in planning and for this course of care.     Thank you for your referral. Please co-sign or sign and return this letter via fax as soon as possible to [phone]. If you have any  questions, please contact me at Dept: [phone]    Sincerely,  Electronically signed by therapist: Zahraa Oliveira, PT  Physician's certification required: Yes  I certify the need for these services furnished under this plan of treatment and while under my care.    X___________________________________________________ Date____________________    Certification From: 1/14/2025  To:4/14/2025

## 2025-01-16 ENCOUNTER — OFFICE VISIT (OUTPATIENT)
Dept: PHYSICAL THERAPY | Facility: HOSPITAL | Age: 30
End: 2025-01-16
Attending: PHYSICIAN ASSISTANT
Payer: COMMERCIAL

## 2025-01-16 PROCEDURE — 97112 NEUROMUSCULAR REEDUCATION: CPT

## 2025-01-16 NOTE — PROGRESS NOTES
Diagnosis:   Dizziness      Visual motion sensitivity  Vestibular migraines        Referring Provider: Melody Dyer  Date of Evaluation:    1/14/25    Precautions:  Fall Risk Next MD visit:   none scheduled  Date of Surgery: n/a   Insurance Primary/Secondary: BCBS POS / N/A     # Auth Visits: no auth no limit             Subjective: pt reports she has been taking iron supplement so dizziness has been a little better. Doing HEP. It causes a little bit of dizziness.    Pain: 0/10. 0/10 dizziness while at rest       Objective:     1/16/25:   DVA: 2 line difference   Positional testing: all canals negative      Assessment: positional testing repeated w/ goggles and DVA assessed. DVA and positional testing all insignificant. Habituation exercises incorporated. Pt required cues to just move head and not entire body during VORC exercises. Mild dizziness w/ exercises, dizziness lasted about 1 min after. Pt will continue to benefit from skilled PT in order to address deficits.       Goals:     - Within 5 sessions, pt will be consistent and independent w/ HEP, thus facilitating recovery.   -Within 5 session, pt will report no greater than 2/10 dizziness on daily basis.   -Within 10 sessions, pt will be able to complete VOR cancellation in sitting for 30 seconds w/o reports of symptoms, thus allowing her to ride in car w/o dizziness.   -Within 10 sessions, pt will be able to perform all bed mobility w/o reports of dizziness, allowing for increased safety when going from sitting to standing.     Plan: progress as able  Date: 1/16/2025  TX#: 2/10 Date:                 TX#: 3/ Date:                 TX#: 4/ Date:                 TX#: 5/ Date:   Tx#: 6/   Neuro re-ed   DVA assessment  Positional testing w/ goggles    Head turns on airex- vertical and horizontal x 30 sec each   EC on airex x 30 sec   VORC on airex x 30 sec vertical and horizontal   Gait w/ VORc x 2 laps // bars   Gait w/ head turns and card naming x 3 min    Cross body taps 3x1 min                             HEP: - VOR Cancellation 6x30 sec        Charges: x3 neuro re-ed (38 min)       Total Timed Treatment: 38  min  Total Treatment Time: 38 min

## 2025-01-29 ENCOUNTER — OFFICE VISIT (OUTPATIENT)
Dept: PHYSICAL THERAPY | Facility: HOSPITAL | Age: 30
End: 2025-01-29
Attending: PHYSICIAN ASSISTANT
Payer: COMMERCIAL

## 2025-01-29 ENCOUNTER — TELEPHONE (OUTPATIENT)
Dept: PHYSICAL THERAPY | Facility: HOSPITAL | Age: 30
End: 2025-01-29

## 2025-01-29 PROCEDURE — 97112 NEUROMUSCULAR REEDUCATION: CPT

## 2025-01-30 NOTE — PROGRESS NOTES
Diagnosis:   Dizziness      Visual motion sensitivity  Vestibular migraines        Referring Provider: Melody Dyer  Date of Evaluation:    1/14/25    Precautions:  Fall Risk Next MD visit:   none scheduled  Date of Surgery: n/a   Insurance Primary/Secondary: BCBS POS / N/A     # Auth Visits: no auth no limit             Subjective: Pt reports her dizziness has been a little better. She is felling more nauseous then dizzy recently.     Pain: 0/10. 0/10 dizziness while at rest       Objective:     1/16/25:   DVA: 2 line difference   Positional testing: all canals negative      Assessment: Pt continues to present w/ symptoms of visual motions sensitivity. Brief symptoms during most exercises. Had most symptoms  w/ gait w/ busy background. Advised to add busy background to HEP. Pt will continue to benefit from skilled PT in order to address deficits.       Goals:     - Within 5 sessions, pt will be consistent and independent w/ HEP, thus facilitating recovery.   -Within 5 session, pt will report no greater than 2/10 dizziness on daily basis.   -Within 10 sessions, pt will be able to complete VOR cancellation in sitting for 30 seconds w/o reports of symptoms, thus allowing her to ride in car w/o dizziness.   -Within 10 sessions, pt will be able to perform all bed mobility w/o reports of dizziness, allowing for increased safety when going from sitting to standing.     Plan: progress as able  Date: 1/16/2025  TX#: 2/10 Date:  1/29/25               TX#: 3/10 Date:                 TX#: 4/ Date:                 TX#: 5/ Date:   Tx#: 6/   Neuro re-ed   DVA assessment  Positional testing w/ goggles    Head turns on airex- vertical and horizontal x 30 sec each   EC on airex x 30 sec   VORC on airex x 30 sec vertical and horizontal   Gait w/ VORc x 2 laps // bars   Gait w/ head turns and card naming x 3 min   Cross body taps 3x1 min  Neuro re-ed   Partial tandem on airex w/ head turns-x 30 sec horizontal and vertical  RDLs  on foam w/ 5lb weight - w/ 10lb weight - x 10 R/L   Gait w/ VORC w/ busy background horizontal - x 2 laps // bars   Gait w/ VORC w/ busy background x 2 laps // bars  - vertical   Standing on airex w/ rotations- w/ 7lb x 1 min   Plank on gerry table - elbows to hands w/ EC - 30 sec   Plank on mat w/ elbows extended and arm rotating outwards w/ VORC- x5 R/L w/ 2lb weight   Plank on mat table- elbows ext w/ 2lb weight- extending arm forward- x 5 R/L   Cross body taps x1 min                            HEP: - VOR Cancellation 6x30 sec        Charges: x2 neuro re-ed (30 min)       Total Timed Treatment: 30  min  Total Treatment Time: 30 min

## 2025-02-07 ENCOUNTER — OFFICE VISIT (OUTPATIENT)
Dept: PHYSICAL THERAPY | Facility: HOSPITAL | Age: 30
End: 2025-02-07
Attending: PHYSICIAN ASSISTANT
Payer: COMMERCIAL

## 2025-02-07 PROCEDURE — 97112 NEUROMUSCULAR REEDUCATION: CPT

## 2025-02-07 NOTE — PROGRESS NOTES
Diagnosis:   Dizziness      Visual motion sensitivity  Vestibular migraines        Referring Provider: Melody Dyer  Date of Evaluation:    1/14/25    Precautions:  Fall Risk Next MD visit:   none scheduled  Date of Surgery: n/a   Insurance Primary/Secondary: BCBS POS / N/A     # Auth Visits: no auth no limit             Subjective: Pt reports she has only had a little bit of dizziness recently. Believes it is still related to head movements.     Pain: 0/10. 0/10 dizziness while at rest       Objective:     1/16/25:   DVA: 2 line difference   Positional testing: all canals negative      Assessment: Pt tolerated session well. Continues to have some symptoms w/ head turns and visual motion, but less symptomatic than last session. Reported only mild dizziness by end of session. Poor proprioception noted during core w/ EC, Pt will continue to benefit from skilled PT in order to address deficits.       Goals: assessed 2/7/25    - Within 5 sessions, pt will be consistent and independent w/ HEP, thus facilitating recovery. -met   -Within 5 session, pt will report no greater than 2/10 dizziness on daily basis. - in progress   -Within 10 sessions, pt will be able to complete VOR cancellation in sitting for 30 seconds w/o reports of symptoms, thus allowing her to ride in car w/o dizziness. - in progress   -Within 10 sessions, pt will be able to perform all bed mobility w/o reports of dizziness, allowing for increased safety when going from sitting to standing. - in progress     Plan: progress as able. Progress w/ core w/ EC   Date: 1/16/2025  TX#: 2/10 Date:  1/29/25               TX#: 3/10 Date:   2/7/25             TX#: 4/10 Date:                 TX#: 5/ Date:   Tx#: 6/   Neuro re-ed   DVA assessment  Positional testing w/ goggles    Head turns on airex- vertical and horizontal x 30 sec each   EC on airex x 30 sec   VORC on airex x 30 sec vertical and horizontal   Gait w/ VORc x 2 laps // bars   Gait w/ head turns  and card naming x 3 min   Cross body taps 3x1 min  Neuro re-ed   Partial tandem on airex w/ head turns-x 30 sec horizontal and vertical  RDLs on foam w/ 5lb weight - w/ 10lb weight - x 10 R/L   Gait w/ VORC w/ busy background horizontal - x 2 laps // bars   Gait w/ VORC w/ busy background x 2 laps // bars  - vertical   Standing on airex w/ rotations- w/ 7lb x 1 min   Plank on gerry table - elbows to hands w/ EC - 30 sec   Plank on mat w/ elbows extended and arm rotating outwards w/ VORC- x5 R/L w/ 2lb weight   Plank on mat table- elbows ext w/ 2lb weight- extending arm forward- x 5 R/L   Cross body taps x1 min  Neuro re-ed   [facility] rainbows in standing 2x1 min w/ VORC  Bean bag  and toss from low leticia w/ vertical and horizontal head turns. 1x3 min   Gait w/ watching moving video on screen 3x1 min  Gait w/ VORC w/ busy background 2 x 40 ft vertical and horizontal  Rotational ball toss against wall. 3x1 min   Woodchops w/ VORC x 10 each side   Bird dog w/ EC x 10   Plank from elbows to hands x6 w/ EC   Plank w/ rotations and VORC x 5 R/L w/ 2lb weight                          HEP: - VOR Cancellation 6x30 sec        Charges: x2 neuro re-ed (30 min)       Total Timed Treatment: 30  min  Total Treatment Time: 30 min

## 2025-02-11 ENCOUNTER — OFFICE VISIT (OUTPATIENT)
Dept: PHYSICAL THERAPY | Facility: HOSPITAL | Age: 30
End: 2025-02-11
Attending: PHYSICIAN ASSISTANT
Payer: COMMERCIAL

## 2025-02-11 PROCEDURE — 97112 NEUROMUSCULAR REEDUCATION: CPT

## 2025-02-11 NOTE — PROGRESS NOTES
Diagnosis:   Dizziness      Visual motion sensitivity  Vestibular migraines        Referring Provider: Melody Dyer  Date of Evaluation:    1/14/25    Precautions:  Fall Risk Next MD visit:   none scheduled  Date of Surgery: n/a   Insurance Primary/Secondary: BCBS POS / N/A     # Auth Visits: no auth no limit             Subjective: Pt had a migraine last night before bed. Reports that they didn't sleep the best. No dizziness today. Feels like dizziness is improving since starting therapy.     Pain: 0/10. 0/10 dizziness while at rest       Objective:     1/16/25:   DVA: 2 line difference   Positional testing: all canals negative      Assessment: Pt tolerated session well. Horizontal VORC to R caused more symptoms of dizziness on this date. Reports mild dizziness throughout session that subsides <1 minute following exercise. Demonstrated good proprioception during core stability with VORC interventions. Pt will continue to benefit from skilled PT in order to address deficits.       Goals: assessed 2/7/25    - Within 5 sessions, pt will be consistent and independent w/ HEP, thus facilitating recovery. -met   -Within 5 session, pt will report no greater than 2/10 dizziness on daily basis. - in progress   -Within 10 sessions, pt will be able to complete VOR cancellation in sitting for 30 seconds w/o reports of symptoms, thus allowing her to ride in car w/o dizziness. - in progress   -Within 10 sessions, pt will be able to perform all bed mobility w/o reports of dizziness, allowing for increased safety when going from sitting to standing. - in progress     Plan: progress as able. Progress w/ core w/ EC   Date: 1/16/2025  TX#: 2/10 Date:  1/29/25               TX#: 3/10 Date:   2/7/25             TX#: 4/10 Date: 2/11/2025                TX#: 5/10 Date:   Tx#: 6/   Neuro re-ed   DVA assessment  Positional testing w/ goggles    Head turns on airex- vertical and horizontal x 30 sec each   EC on airex x 30 sec   VORC  on airex x 30 sec vertical and horizontal   Gait w/ VORc x 2 laps // bars   Gait w/ head turns and card naming x 3 min   Cross body taps 3x1 min  Neuro re-ed   Partial tandem on airex w/ head turns-x 30 sec horizontal and vertical  RDLs on foam w/ 5lb weight - w/ 10lb weight - x 10 R/L   Gait w/ VORC w/ busy background horizontal - x 2 laps // bars   Gait w/ VORC w/ busy background x 2 laps // bars  - vertical   Standing on airex w/ rotations- w/ 7lb x 1 min   Plank on gerry table - elbows to hands w/ EC - 30 sec   Plank on mat w/ elbows extended and arm rotating outwards w/ VORC- x5 R/L w/ 2lb weight   Plank on mat table- elbows ext w/ 2lb weight- extending arm forward- x 5 R/L   Cross body taps x1 min  Neuro re-ed   [facility] rainbows in standing 2x1 min w/ VORC  Bean bag  and toss from low leticia w/ vertical and horizontal head turns. 1x3 min   Gait w/ watching moving video on screen 3x1 min  Gait w/ VORC w/ busy background 2 x 40 ft vertical and horizontal  Rotational ball toss against wall. 3x1 min   Woodchops w/ VORC x 10 each side   Bird dog w/ EC x 10   Plank from elbows to hands x6 w/ EC   Plank w/ rotations and VORC x 5 R/L w/ 2lb weight Neuro re-ed: 30'  GTB horizontal abduction w/ VORC 2x10 L/R  GTB scaption w/ VORC 2x10 L/R  Gait w/ VORC using tennis ball vertical/horizontal/rainbows 2 x 40 ft each  Elevated plank on plinth with VORC horizontal and vertical 2x30s each  Bird dog w/ GTB VORC x10 L/R                         HEP: - VOR Cancellation 6x30 sec        Charges: x2 neuro re-ed (30 min)       Total Timed Treatment: 30  min  Total Treatment Time: 30 min  This treatment was provided by JANNETTE Parker under the direct and constant direction and supervision of a licensed therapist, who provided consultation regarding skilled judgements, treatment, and assessment of patient care.     no

## 2025-02-19 ENCOUNTER — OFFICE VISIT (OUTPATIENT)
Dept: PHYSICAL THERAPY | Facility: HOSPITAL | Age: 30
End: 2025-02-19
Attending: PHYSICIAN ASSISTANT
Payer: COMMERCIAL

## 2025-02-19 PROCEDURE — 97112 NEUROMUSCULAR REEDUCATION: CPT

## 2025-02-19 NOTE — PROGRESS NOTES
Diagnosis:   Dizziness      Visual motion sensitivity  Vestibular migraines        Referring Provider: Melody Dyer  Date of Evaluation:    1/14/25    Precautions:  Fall Risk Next MD visit:   none scheduled  Date of Surgery: n/a   Insurance Primary/Secondary: BCBS POS / N/A     # Auth Visits: no auth no limit             Subjective: Pt reports she has not had much dizziness since last session.     Pain: 0/10. 0/10 dizziness while at rest       Objective:     1/16/25:   DVA: 2 line difference   Positional testing: all canals negative      Assessment: Pt tolerated session well. Only reported minimal symptoms this session. Horizontal VORC continues to provoke more symptoms than vertical. Has good balance on bosu w/ head turns. Pt will continue to benefit from skilled PT in order to address deficits.       Goals: assessed 2/7/25    - Within 5 sessions, pt will be consistent and independent w/ HEP, thus facilitating recovery. -met   -Within 5 session, pt will report no greater than 2/10 dizziness on daily basis. - in progress   -Within 10 sessions, pt will be able to complete VOR cancellation in sitting for 30 seconds w/o reports of symptoms, thus allowing her to ride in car w/o dizziness. - in progress   -Within 10 sessions, pt will be able to perform all bed mobility w/o reports of dizziness, allowing for increased safety when going from sitting to standing. - in progress     Plan: progress as able. Progress w/ core w/ EC   Date: 1/16/2025  TX#: 2/10 Date:  1/29/25               TX#: 3/10 Date:   2/7/25             TX#: 4/10 Date: 2/11/2025                TX#: 5/10 Date: 2/19/25  Tx#: 6/10   Neuro re-ed   DVA assessment  Positional testing w/ goggles    Head turns on airex- vertical and horizontal x 30 sec each   EC on airex x 30 sec   VORC on airex x 30 sec vertical and horizontal   Gait w/ VORc x 2 laps // bars   Gait w/ head turns and card naming x 3 min   Cross body taps 3x1 min  Neuro re-ed   Partial tandem  on airex w/ head turns-x 30 sec horizontal and vertical  RDLs on foam w/ 5lb weight - w/ 10lb weight - x 10 R/L   Gait w/ VORC w/ busy background horizontal - x 2 laps // bars   Gait w/ VORC w/ busy background x 2 laps // bars  - vertical   Standing on airex w/ rotations- w/ 7lb x 1 min   Plank on gerry table - elbows to hands w/ EC - 30 sec   Plank on mat w/ elbows extended and arm rotating outwards w/ VORC- x5 R/L w/ 2lb weight   Plank on mat table- elbows ext w/ 2lb weight- extending arm forward- x 5 R/L   Cross body taps x1 min  Neuro re-ed   [facility] rainbows in standing 2x1 min w/ VORC  Bean bag  and toss from low leticia w/ vertical and horizontal head turns. 1x3 min   Gait w/ watching moving video on screen 3x1 min  Gait w/ VORC w/ busy background 2 x 40 ft vertical and horizontal  Rotational ball toss against wall. 3x1 min   Woodchops w/ VORC x 10 each side   Bird dog w/ EC x 10   Plank from elbows to hands x6 w/ EC   Plank w/ rotations and VORC x 5 R/L w/ 2lb weight Neuro re-ed: 30'  GTB horizontal abduction w/ VORC 2x10 L/R  GTB scaption w/ VORC 2x10 L/R  Gait w/ VORC using tennis ball vertical/horizontal/rainbows 2 x 40 ft each  Elevated plank on plinth with VORC horizontal and vertical 2x30s each  Bird dog w/ GTB VORC x10 L/R Neuro re-ed   Planks w/ rotations and VORC w/ face cards 3x30 sec   Russian twistrs on SB w/ VORC w/ 2lb weight 2x30 sec   \"Spot it\" game standing on Bosu 2x3 min   Gait w/ VORC x 40 ft horizontal and vertical   Gait on foam balance beam w/ head turns and card taps x 6 rounds   Side stepping on foam balance beams w/ vertical head turns and targets tapping.   5 mi portillo on treadmill lv 2                         HEP: - VOR Cancellation 6x30 sec        Charges: x3 neuro re-ed (38 min)       Total Timed Treatment: 38  min  Total Treatment Time: 38 min

## 2025-02-24 ENCOUNTER — OFFICE VISIT (OUTPATIENT)
Dept: PHYSICAL THERAPY | Facility: HOSPITAL | Age: 30
End: 2025-02-24
Attending: PHYSICIAN ASSISTANT
Payer: COMMERCIAL

## 2025-02-24 PROCEDURE — 97112 NEUROMUSCULAR REEDUCATION: CPT

## 2025-02-24 NOTE — PROGRESS NOTES
Diagnosis:   Dizziness      Visual motion sensitivity  Vestibular migraines        Referring Provider: Melody Dyer  Date of Evaluation:    1/14/25    Precautions:  Fall Risk Next MD visit:   none scheduled  Date of Surgery: n/a   Insurance Primary/Secondary: BCBS POS / N/A     # Auth Visits: no auth no limit             Subjective: Pt reports she had a migraine Friday and had some dizziness with that. Other than that, no dizziness. .     Pain: 0/10. 0/10 dizziness while at rest       Objective:     1/16/25:   DVA: 2 line difference   Positional testing: all canals negative      Assessment: Pt tolerated session well. Continues to report most symptoms w/ VORC in horizontal direction. Exercises progressed by having them performed on bosu and pt tolerated this well. Advised not to do HEP on days when she has migraines. Pt will continue to benefit from skilled PT in order to address deficits.       Goals: assessed 2/7/25    - Within 5 sessions, pt will be consistent and independent w/ HEP, thus facilitating recovery. -met   -Within 5 session, pt will report no greater than 2/10 dizziness on daily basis. - in progress   -Within 10 sessions, pt will be able to complete VOR cancellation in sitting for 30 seconds w/o reports of symptoms, thus allowing her to ride in car w/o dizziness. - in progress   -Within 10 sessions, pt will be able to perform all bed mobility w/o reports of dizziness, allowing for increased safety when going from sitting to standing. - in progress     Plan: progress as able. Progress w/ core w/ EC   Date: 1/16/2025  TX#: 2/10 Date:  1/29/25               TX#: 3/10 Date:   2/7/25             TX#: 4/10 Date: 2/11/2025                TX#: 5/10 Date: 2/19/25  Tx#: 6/10 Date: 2/24/25  Tx#: 7/10   Neuro re-ed   DVA assessment  Positional testing w/ goggles    Head turns on airex- vertical and horizontal x 30 sec each   EC on airex x 30 sec   VORC on airex x 30 sec vertical and horizontal   Gait w/  VORc x 2 laps // bars   Gait w/ head turns and card naming x 3 min   Cross body taps 3x1 min  Neuro re-ed   Partial tandem on airex w/ head turns-x 30 sec horizontal and vertical  RDLs on foam w/ 5lb weight - w/ 10lb weight - x 10 R/L   Gait w/ VORC w/ busy background horizontal - x 2 laps // bars   Gait w/ VORC w/ busy background x 2 laps // bars  - vertical   Standing on airex w/ rotations- w/ 7lb x 1 min   Plank on gerry table - elbows to hands w/ EC - 30 sec   Plank on mat w/ elbows extended and arm rotating outwards w/ VORC- x5 R/L w/ 2lb weight   Plank on mat table- elbows ext w/ 2lb weight- extending arm forward- x 5 R/L   Cross body taps x1 min  Neuro re-ed   [facility] rainbows in standing 2x1 min w/ VORC  Bean bag  and toss from low leticia w/ vertical and horizontal head turns. 1x3 min   Gait w/ watching moving video on screen 3x1 min  Gait w/ VORC w/ busy background 2 x 40 ft vertical and horizontal  Rotational ball toss against wall. 3x1 min   Woodchops w/ VORC x 10 each side   Bird dog w/ EC x 10   Plank from elbows to hands x6 w/ EC   Plank w/ rotations and VORC x 5 R/L w/ 2lb weight Neuro re-ed: 30'  GTB horizontal abduction w/ VORC 2x10 L/R  GTB scaption w/ VORC 2x10 L/R  Gait w/ VORC using tennis ball vertical/horizontal/rainbows 2 x 40 ft each  Elevated plank on plinth with VORC horizontal and vertical 2x30s each  Bird dog w/ GTB VORC x10 L/R Neuro re-ed   Planks w/ rotations and VORC w/ face cards 3x30 sec   Russian twistrs on SB w/ VORC w/ 2lb weight 2x30 sec   \"Spot it\" game standing on Bosu 2x3 min   Gait w/ VORC x 40 ft horizontal and vertical   Gait on foam balance beam w/ head turns and card taps x 6 rounds   Side stepping on foam balance beams w/ vertical head turns and targets tapping.   5 mi portillo on treadmill lv 2  Neuro re-ed   Planks w/ target tapping- 2x1 min   Cross body taps on foam 3x1 min - horizontal, diagonal  Standing on bosu tossing ball against wall x 2 min .   Standing on bosu  tossing and catching ball against ground. X2 min   VORc on bosu x 1 min vertical and horizontal  5 mi portillo on treadmill lv 2   [place] ball toss from hand to hand w/ head turns- x 1 min . Progressed to 1 min of walking- x2 min                           HEP: - VOR Cancellation 6x30 sec        Charges: x2 neuro re-ed (30 min)       Total Timed Treatment: 30  min  Total Treatment Time: 30 min

## 2025-03-04 ENCOUNTER — OFFICE VISIT (OUTPATIENT)
Dept: PHYSICAL THERAPY | Facility: HOSPITAL | Age: 30
End: 2025-03-04
Attending: PHYSICIAN ASSISTANT
Payer: COMMERCIAL

## 2025-03-04 PROCEDURE — 97112 NEUROMUSCULAR REEDUCATION: CPT

## 2025-03-04 NOTE — PROGRESS NOTES
Diagnosis:   Dizziness      Visual motion sensitivity  Vestibular migraines        Referring Provider: Melody Dyer  Date of Evaluation:    1/14/25    Precautions:  Fall Risk Next MD visit:   none scheduled  Date of Surgery: n/a   Insurance Primary/Secondary: BCBS POS / N/A     # Auth Visits: no auth no limit             Subjective: Pt states that they have been fighting a migraine since last Thursday. States that it is causing them a lot of dizziness when walking.     Pain: 0/10. 0/10 dizziness while at rest. 1/10 migraine, feels like dull ache.       Objective:     1/16/25:   DVA: 2 line difference   Positional testing: all canals negative      Assessment: Pt tolerated session well. They noted an Increase in dizziness during VORC with head turns to R. Continued to challenge pt with a variety of habituation exercises to improve tolerance with ambulating in busy environments and head turns. Pt will continue to benefit from skilled PT in order to address deficits.       Goals: assessed 2/7/25    - Within 5 sessions, pt will be consistent and independent w/ HEP, thus facilitating recovery. -met   -Within 5 session, pt will report no greater than 2/10 dizziness on daily basis. - in progress   -Within 10 sessions, pt will be able to complete VOR cancellation in sitting for 30 seconds w/o reports of symptoms, thus allowing her to ride in car w/o dizziness. - in progress   -Within 10 sessions, pt will be able to perform all bed mobility w/o reports of dizziness, allowing for increased safety when going from sitting to standing. - in progress     Plan: progress as able. Progress w/ core w/ EC   Date:   2/7/25             TX#: 4/10 Date: 2/11/2025                TX#: 5/10 Date: 2/19/25  Tx#: 6/10 Date: 2/24/25  Tx#: 7/10 Date: 3/4/25  Tx#: 8/10   Neuro re-ed   [facility] rainbows in standing 2x1 min w/ VORC  Bean bag  and toss from low leticia w/ vertical and horizontal head turns. 1x3 min   Gait w/ watching moving  video on screen 3x1 min  Gait w/ VORC w/ busy background 2 x 40 ft vertical and horizontal  Rotational ball toss against wall. 3x1 min   Woodchops w/ VORC x 10 each side   Bird dog w/ EC x 10   Plank from elbows to hands x6 w/ EC   Plank w/ rotations and VORC x 5 R/L w/ 2lb weight Neuro re-ed: 30'  GTB horizontal abduction w/ VORC 2x10 L/R  GTB scaption w/ VORC 2x10 L/R  Gait w/ VORC using tennis ball vertical/horizontal/rainbows 2 x 40 ft each  Elevated plank on plinth with VORC horizontal and vertical 2x30s each  Bird dog w/ GTB VORC x10 L/R Neuro re-ed   Planks w/ rotations and VORC w/ face cards 3x30 sec   Russian twistrs on SB w/ VORC w/ 2lb weight 2x30 sec   \"Spot it\" game standing on Bosu 2x3 min   Gait w/ VORC x 40 ft horizontal and vertical   Gait on foam balance beam w/ head turns and card taps x 6 rounds   Side stepping on foam balance beams w/ vertical head turns and targets tapping.   5 mi portillo on treadmill lv 2  Neuro re-ed   Planks w/ target tapping- 2x1 min   Cross body taps on foam 3x1 min - horizontal, diagonal  Standing on bosu tossing ball against wall x 2 min .   Standing on bosu tossing and catching ball against ground. X2 min   VORc on bosu x 1 min vertical and horizontal  5 mi portillo on treadmill lv 2   [place] ball toss from hand to hand w/ head turns- x 1 min . Progressed to 1 min of walking- x2 min Neuro re-ed  Seated VORC 2x30 sec  Standing on airex with forward tossing ball off mirror with visual fixation on ball x2 min  Standing on airex with side tossing ball off mirror with visual fixation on bal x1 min L/R  Tandem gait with horizontal head turn each step x3 laps // bars  Squat with vertical ball toss + visual fixation on ball 2x30 sec  Paloff cable rotation 15# 2x10 L/R                          HEP: - VOR Cancellation 6x30 sec      This treatment was provided by JANNETTE Parker under the direct and constant direction and supervision of a licensed therapist, who provided consultation  regarding skilled judgements, treatment, and assessment of patient care.     Charges: x3 neuro re-ed (35 min)       Total Timed Treatment: 35  min  Total Treatment Time: 35 min

## 2025-03-10 ENCOUNTER — OFFICE VISIT (OUTPATIENT)
Dept: PHYSICAL THERAPY | Facility: HOSPITAL | Age: 30
End: 2025-03-10
Attending: PHYSICIAN ASSISTANT
Payer: COMMERCIAL

## 2025-03-10 PROCEDURE — 97112 NEUROMUSCULAR REEDUCATION: CPT

## 2025-03-10 NOTE — PROGRESS NOTES
Patient: Yina Perkins (29 year old, female) Referring Provider:  Insurance:   Diagnosis: Dizziness      Visual motion sensitivity  Vestibular migraines Melody Dyer  [employer] POS   Date of Surgery: No data recorded Next MD visit:  N/A   Precautions:  None No data recorded Referral Information:    Date of Evaluation: Req: 0, Auth: 0, Exp:     01/14/25 POC Auth Visits:  0       Today's Date   3/10/2025    Subjective  Pt reports she had some dizziness yesterday. It is less today.       Pain: 1/10 - dizziness     Objective          1/16/25:   DVA: 2 line difference   Positional testing: all canals negative     Assessment  Pt tolerated sesssion well. reported some mild dizziness w/ horizontal head turns. Difficulty balancing on SL this session. reported being ready for discharge next session.    Goals (to be met in 10 visits)   assessed 3/10/25    - Within 5 sessions, pt will be consistent and independent w/ HEP, thus facilitating recovery. -met   -Within 5 session, pt will report no greater than 2/10 dizziness on daily basis. - in progress   -Within 10 sessions, pt will be able to complete VOR cancellation in sitting for 30 seconds w/o reports of symptoms, thus allowing her to ride in car w/o dizziness. - in progress   -Within 10 sessions, pt will be able to perform all bed mobility w/o reports of dizziness, allowing for increased safety when going from     Plan  progress as able    Treatment Last 4 Visits       3/10/2025   PT Treatment   Treatment Day 9          3/10/2025   Vestibular Treatment   Treatment Day 9   Neuro Re-Education Treadmill LARIOS 5 min at 1.9 mph   Standing woodchops w/ VORc x15 R/L w/ 20 lbs   SL balance w/ alternating pole tap x2 rounds on R/L LE  Gait w/ ball toss to self 3x20 ft   Tandem on airex tossing ball up and down against wall x3 min   Standing on airex, turning and tossing ball against wall x 2 min   Horizontal shoulder abd w/ GTB w/ VORC and tracking x 15 R/L   Standing Ys w/  VORC x 10 R/L    Neuro Re-Educ Minutes 30   Total Time Of Timed Procedures 30   Total Time Of Service-Based Procedures 0   Total Treatment Time 30   HEP - VOR Cancellation 6x30 sec        HEP  - VOR Cancellation 6x30 sec    Charges  x2 neuro re-ed

## 2025-03-17 ENCOUNTER — OFFICE VISIT (OUTPATIENT)
Dept: PHYSICAL THERAPY | Facility: HOSPITAL | Age: 30
End: 2025-03-17
Attending: PHYSICIAN ASSISTANT
Payer: COMMERCIAL

## 2025-03-17 PROCEDURE — 97112 NEUROMUSCULAR REEDUCATION: CPT

## 2025-03-17 NOTE — PROGRESS NOTES
Patient: Yina Perkins (29 year old, female) Referring Provider:  Insurance:   Diagnosis: Dizziness      Visual motion sensitivity  Vestibular migraines Melody Dyer  [employer] POS   Date of Surgery: No data recorded Next MD visit:  N/A   Precautions:  None No data recorded Referral Information:    Date of Evaluation: Req: 0, Auth: 0, Exp:     01/14/25 POC Auth Visits:  0       Discharge Summary  Pt has attended 10 visits in Physical Therapy.     Today's Date   3/17/2025    Subjective  Pt reports she had some dizziness yesterday. feels good today. Ready for discharge.       Pain: 0/10     Objective       Occulomotor & Vestibulo-Ocular Exam:  Spontaneous Nystagmus: room light: none ;  fixation blocked: NT  Smooth Pursuit: Negative  Saccades: Negative  Gaze Evoked Nystagmus:  room light: Negative; fixation blocked: Not Tested  Head Thrust: (-)  VOR screen:  mild dizziness    VOR Cancellation: mild dizziness   Convergence: positive for blurriness  Cover/Uncover:  Negative  Cross Cover:  Negative  Head Shaking Nystagmus: Not Tested  Dynamic Visual Acuity:  1 line difference    DHI 26     Assessment  Pt presents for last scheduled session. Reports consistency w/ HEP. still has some symptoms in busy visual environments and w/ head movements, but they have improved. DHI has imporved from 66 to 26. Pt will be discharged w/ HEP. HEP reviewed and pt in agreement w/ discharge plan.    Goals (to be met in 10 visits)   assessed 3/17/25    - Within 5 sessions, pt will be consistent and independent w/ HEP, thus facilitating recovery. -met   -Within 5 session, pt will report no greater than 2/10 dizziness on daily basis. - in progress   -Within 10 sessions, pt will be able to complete VOR cancellation in sitting for 30 seconds w/o reports of symptoms, thus allowing her to ride in car w/o dizziness. - in progress   -Within 10 sessions, pt will be able to perform all bed mobility w/o reports of dizziness, allowing for  increased safety when going from sitting to standing. - Met         Plan       Treatment Last 4 Visits       3/10/2025 3/17/2025   PT Treatment   Treatment Day 9 10          3/10/2025 3/17/2025   Vestibular Treatment   Treatment Day 9 10   Neuro Re-Education Treadmill LARIOS 5 min at 1.9 mph   Standing woodchops w/ VORc x15 R/L w/ 20 lbs   SL balance w/ alternating pole tap x2 rounds on R/L LE  Gait w/ ball toss to self 3x20 ft   Tandem on airex tossing ball up and down against wall x3 min   Standing on airex, turning and tossing ball against wall x 2 min   Horizontal shoulder abd w/ GTB w/ VORC and tracking x 15 R/L   Standing Ys w/ VORC x 10 R/L  Objective re-measure   VORC on airex 3x30 sec vertical and horizontal   Cross body SL RDLs w/ two targets x 10 R/L LE.   Standing on airex- cone transfer w/ opp UE and rainbows. X7 cones. X 2 rounds   Gait w/ VORC w/ busy background- x 40 ft horizontal and vertical.   Trunk rotations w/ green band x 15 R/L   Horizontal ABD on airex w/ GTB x 10 R/L    Neuro Re-Educ Minutes 30 35   Total Time Of Timed Procedures 30 35   Total Time Of Service-Based Procedures 0 0   Total Treatment Time 30 35   HEP - VOR Cancellation 6x30 sec         HEP  - VOR Cancellation 6x30 sec    Charges  x3 neuro re-ed       DHI posty: 26    Plan: Discharge w/ HEP.     Patient/Family/Caregiver was advised of these findings, precautions, and treatment options and has agreed to actively participate in planning and for this course of care.    Thank you for your referral. If you have any questions, please contact me at Dept: [phone].    Sincerely,  Electronically signed by therapist: Zahraa Oliveira, PT     Physician's certification required:  Yes  Please co-sign or sign and return this letter via fax as soon as possible to [phone].   I certify the need for these services furnished under this plan of treatment and while under my care.    X___________________________________________________  Date____________________    Certification From: 3/17/2025  To:6/15/2025